# Patient Record
Sex: MALE | Race: WHITE
[De-identification: names, ages, dates, MRNs, and addresses within clinical notes are randomized per-mention and may not be internally consistent; named-entity substitution may affect disease eponyms.]

---

## 2019-10-29 ENCOUNTER — HOSPITAL ENCOUNTER (INPATIENT)
Dept: HOSPITAL 35 - ER | Age: 69
LOS: 1 days | Discharge: HOME | DRG: 377 | End: 2019-10-30
Attending: INTERNAL MEDICINE | Admitting: INTERNAL MEDICINE
Payer: COMMERCIAL

## 2019-10-29 VITALS — SYSTOLIC BLOOD PRESSURE: 147 MMHG | DIASTOLIC BLOOD PRESSURE: 106 MMHG

## 2019-10-29 VITALS — WEIGHT: 195 LBS | BODY MASS INDEX: 27.92 KG/M2 | HEIGHT: 70 IN

## 2019-10-29 VITALS — SYSTOLIC BLOOD PRESSURE: 150 MMHG | DIASTOLIC BLOOD PRESSURE: 62 MMHG

## 2019-10-29 VITALS — SYSTOLIC BLOOD PRESSURE: 160 MMHG | DIASTOLIC BLOOD PRESSURE: 110 MMHG

## 2019-10-29 VITALS — SYSTOLIC BLOOD PRESSURE: 76 MMHG | DIASTOLIC BLOOD PRESSURE: 36 MMHG

## 2019-10-29 VITALS — SYSTOLIC BLOOD PRESSURE: 142 MMHG | DIASTOLIC BLOOD PRESSURE: 81 MMHG

## 2019-10-29 VITALS — SYSTOLIC BLOOD PRESSURE: 158 MMHG | DIASTOLIC BLOOD PRESSURE: 116 MMHG

## 2019-10-29 VITALS — DIASTOLIC BLOOD PRESSURE: 109 MMHG | SYSTOLIC BLOOD PRESSURE: 165 MMHG

## 2019-10-29 VITALS — DIASTOLIC BLOOD PRESSURE: 109 MMHG | SYSTOLIC BLOOD PRESSURE: 152 MMHG

## 2019-10-29 DIAGNOSIS — D12.5: ICD-10-CM

## 2019-10-29 DIAGNOSIS — Z90.49: ICD-10-CM

## 2019-10-29 DIAGNOSIS — Z71.6: ICD-10-CM

## 2019-10-29 DIAGNOSIS — Z87.11: ICD-10-CM

## 2019-10-29 DIAGNOSIS — K29.01: Primary | ICD-10-CM

## 2019-10-29 DIAGNOSIS — Z79.899: ICD-10-CM

## 2019-10-29 DIAGNOSIS — F17.210: ICD-10-CM

## 2019-10-29 DIAGNOSIS — F90.9: ICD-10-CM

## 2019-10-29 DIAGNOSIS — N17.0: ICD-10-CM

## 2019-10-29 DIAGNOSIS — K57.30: ICD-10-CM

## 2019-10-29 DIAGNOSIS — K21.9: ICD-10-CM

## 2019-10-29 DIAGNOSIS — Z71.51: ICD-10-CM

## 2019-10-29 DIAGNOSIS — F12.10: ICD-10-CM

## 2019-10-29 DIAGNOSIS — K59.00: ICD-10-CM

## 2019-10-29 DIAGNOSIS — K58.9: ICD-10-CM

## 2019-10-29 LAB
ALBUMIN SERPL-MCNC: 4.3 G/DL (ref 3.4–5)
ALT SERPL-CCNC: 28 U/L (ref 30–65)
ANION GAP SERPL CALC-SCNC: 12 MMOL/L (ref 7–16)
AST SERPL-CCNC: 33 U/L (ref 15–37)
BASOPHILS NFR BLD AUTO: 0.8 % (ref 0–2)
BILIRUB DIRECT SERPL-MCNC: < 0.1 MG/DL
BILIRUB SERPL-MCNC: 0.4 MG/DL
BILIRUB UR-MCNC: NEGATIVE MG/DL
BUN SERPL-MCNC: 20 MG/DL (ref 7–18)
CALCIUM SERPL-MCNC: 10 MG/DL (ref 8.5–10.1)
CHLORIDE SERPL-SCNC: 101 MMOL/L (ref 98–107)
CO2 SERPL-SCNC: 25 MMOL/L (ref 21–32)
COLOR UR: YELLOW
CREAT SERPL-MCNC: 1.5 MG/DL (ref 0.7–1.3)
EOSINOPHIL NFR BLD: 1 % (ref 0–3)
ERYTHROCYTE [DISTWIDTH] IN BLOOD BY AUTOMATED COUNT: 13 % (ref 10.5–14.5)
GLUCOSE SERPL-MCNC: 98 MG/DL (ref 74–106)
GRANULOCYTES NFR BLD MANUAL: 71.1 % (ref 36–66)
HCT VFR BLD CALC: 47.2 % (ref 42–52)
HGB BLD-MCNC: 16.2 GM/DL (ref 14–18)
KETONES UR STRIP-MCNC: NEGATIVE MG/DL
LG PLATELETS BLD QL SMEAR: (no result)
LIPASE: 179 U/L (ref 73–393)
LYMPHOCYTES NFR BLD AUTO: 18.6 % (ref 24–44)
MCH RBC QN AUTO: 30.7 PG (ref 26–34)
MCHC RBC AUTO-ENTMCNC: 34.4 G/DL (ref 28–37)
MCV RBC: 89.2 FL (ref 80–100)
MONOCYTES NFR BLD: 8.5 % (ref 1–8)
NEUTROPHILS # BLD: 8 THOU/UL (ref 1.4–8.2)
PLATELET # BLD: 258 THOU/UL (ref 150–400)
POTASSIUM SERPL-SCNC: 4 MMOL/L (ref 3.5–5.1)
PROT SERPL-MCNC: 8.3 G/DL (ref 6.4–8.2)
RBC # BLD AUTO: 5.29 MIL/UL (ref 4.5–6)
RBC # UR STRIP: NEGATIVE /UL
SODIUM SERPL-SCNC: 138 MMOL/L (ref 136–145)
SP GR UR STRIP: 1.01 (ref 1–1.03)
URINE CLARITY: CLEAR
URINE GLUCOSE-RANDOM*: NEGATIVE
URINE LEUKOCYTES-REFLEX: NEGATIVE
URINE NITRITE-REFLEX: NEGATIVE
URINE PROTEIN (DIPSTICK): NEGATIVE
UROBILINOGEN UR STRIP-ACNC: 0.2 E.U./DL (ref 0.2–1)
WBC # BLD AUTO: 11.3 THOU/UL (ref 4–11)

## 2019-10-29 PROCEDURE — 0DBH8ZZ EXCISION OF CECUM, VIA NATURAL OR ARTIFICIAL OPENING ENDOSCOPIC: ICD-10-PCS | Performed by: INTERNAL MEDICINE

## 2019-10-29 PROCEDURE — 0DB68ZX EXCISION OF STOMACH, VIA NATURAL OR ARTIFICIAL OPENING ENDOSCOPIC, DIAGNOSTIC: ICD-10-PCS | Performed by: INTERNAL MEDICINE

## 2019-10-29 PROCEDURE — 10195: CPT

## 2019-10-29 PROCEDURE — 62900: CPT

## 2019-10-29 PROCEDURE — 0DBN8ZZ EXCISION OF SIGMOID COLON, VIA NATURAL OR ARTIFICIAL OPENING ENDOSCOPIC: ICD-10-PCS | Performed by: INTERNAL MEDICINE

## 2019-10-29 PROCEDURE — 62110: CPT

## 2019-10-29 PROCEDURE — 70005: CPT

## 2019-10-29 PROCEDURE — 0DBL8ZZ EXCISION OF TRANSVERSE COLON, VIA NATURAL OR ARTIFICIAL OPENING ENDOSCOPIC: ICD-10-PCS | Performed by: INTERNAL MEDICINE

## 2019-10-29 NOTE — NUR
CONSULT [phone] COMPLETED.  THIS  MET THE PATIENT.
WE DID LIFE REVIEW ABOUT HIS LIFE AND FAMILY.
WE DISCUSSED SOME DIFFERENT ISSUES.  WE CONCLUDED N PAULINA.

## 2019-10-29 NOTE — NUR
PT ADMITTED RELATED TO ABD PAIN, N/V. CM REVIEWED CHART AND SPOKE WITH CARE
TEAM. CM MET WITH PT AT BEDSIDE THIS DAY. PT IS A&O X4. CM ROLE INTRODUCED. PT
INDICATED HE LIVES IN A CONDO WITH HIS SIG OTHER WITH ELEVATOR ACCESS. PT
INDICATED HE HAD BEEN INDEPDENENT WITH GAIT AND ADLS PTA. PT INDICATED HE
PLANS TO RETURN HOME ONCE MEDICALLY STABLE. CM TO FOLLOW AS INDICATED WITH DC
PLANNING.

## 2019-10-29 NOTE — NUR
TOOK OVER CARE APPROX. 0700. A&OX4. PATIENT ON CLEAR LIQUIDS AND CAN ADVANCE
AS TOLERATED. WILL HAVE COLONOSCOPY TOMORROW AND WILL BE NPO AS OF MIDNIGHT.
PATIENT IS ANXIOUS ABOUT HIS CURRENT ILLNESS AND IS ASKING MANY QUESTIONS
ABOUT PAIN MANGMENT AND HOW CARE WILL PROCEED. REFUSED FLU VACCINE.

## 2019-10-29 NOTE — NUR
Pt arrived on unit from ED @0610 via WC accompanied by staff. A/OX4, oriented
to the room and made comfortable. C/o abd pain, orders obtained for pain meds
awaiting pharmacy verification. Pt is up ad nico encouraged to call for
assistance as needed and agrees to do so. Call light/personal items placed
within reach.

## 2019-10-30 VITALS — SYSTOLIC BLOOD PRESSURE: 135 MMHG | DIASTOLIC BLOOD PRESSURE: 77 MMHG

## 2019-10-30 LAB
ANION GAP SERPL CALC-SCNC: 7 MMOL/L (ref 7–16)
BUN SERPL-MCNC: 11 MG/DL (ref 7–18)
CALCIUM SERPL-MCNC: 9.1 MG/DL (ref 8.5–10.1)
CHLORIDE SERPL-SCNC: 104 MMOL/L (ref 98–107)
CO2 SERPL-SCNC: 29 MMOL/L (ref 21–32)
CREAT SERPL-MCNC: 1.2 MG/DL (ref 0.7–1.3)
GLUCOSE SERPL-MCNC: 74 MG/DL (ref 74–106)
POTASSIUM SERPL-SCNC: 3.9 MMOL/L (ref 3.5–5.1)
SODIUM SERPL-SCNC: 140 MMOL/L (ref 136–145)

## 2019-10-30 NOTE — NUR
ASSUMED CARE OF PT AT 0700. PT PULLED OUT L WRIST IV AND LEFT AMA FOR AN
HOUR, WAS ESCORTED BACK TO THE FLOOR BY SECURITY AND TOLD THAT IF IN ORDER TO
LEAVE AN AMA FORM NEEDS TO BE FILLED OUT PER DOCTOR. PT WENT TO HAVE A
COLONOSCOPY DONE AND WAS GIVEN PAIN MEDICATION BY IM. PT IS A&O X 4. BED IS IN
LOWEST POSITION. CALL LIGHT WITHIN REACH. WILL CONTNIUE TO MONITOR THE PT.

## 2019-10-30 NOTE — NUR
Assess due to RD consult received.  Admit with n/v and abdominal pain.
Diverticulosis without diverticulitis noted.  Further workup continues and pt
npo for colonoscopy.  Has had about 8-10 lb wt loss past month and decreased
intake.  Will follow up again 10/31 for more complete nutrition evaluation
once GI procedure completed.

## 2019-10-30 NOTE — NUR
ASSUMED CARE OF PT @1900 PT ASSESSED AT START OF SHIFT. ON CLEAR LIQUIDS AND
NPO AT MIDNIGHT FOR A COLONOSCOPY IN THE AM. PT AMBULATING IN THE HALLWAY.
NICOTINE PATCH INTACT IN RT ARM. NEW IV INSERTED 20 GUAGE IN RT FOREARM. PAIN
MED GIVEN FOR MANAGEMENT SEE EMARCarolina AGUILERAIEN ALSO GIVEN FOR SLEEP. HOURLY
ROUNDING DONE AND WILL CONT WITH POC TILL EOS.

## 2019-10-31 NOTE — P
[facility]
[address]
[place]   [address]                     PROCEDURE REPORT              
_______________________________________________________________________________
 
Name:       FRANKIE JURADO              Room #:         434-P       [facility] [place]  
M.R.#:      [id number]                       Account #:      [id number]  
Admission:  10/29/19    Attend Phys:    Tez Lainez MD   
Discharge:  10/30/19    Date of Birth:  [birth year]  
                                                          Report #: [id number]
                                                                    [id number]   
_______________________________________________________________________________
THIS REPORT FOR:   //name//                          
 
CC: Tez Blackwell
 
PATIENT OF DOCTORS:  Dr. Tez Lainez and Dr. Cosmo Blackwell.
 
PROCEDURE:  Colonoscopy with snare polypectomy and biopsies.
 
INDICATION FOR PROCEDURE:  Evaluate right lower quadrant abdominal pain.  The
patient is status post appendectomy.  He has a history of colonic
diverticulosis.
 
Informed consent for this procedure was obtained prior to the administration any
medication.  The risks of the procedure include but are not limited to the
following:  Bleeding, perforation, infection, complications of sedation and the
possibility I could miss something.  These possible risks were explained to the
patient and he has indicated his consent to proceed with colonoscopy.
 
Anesthesia kindly provided deep sedation for this procedure.
 
PROCEDURE IN DETAIL:  With the patient in the left lateral decubitus position,
digital rectal exam was performed and no abnormalities were palpated.  Then, the
Olympus colonoscope was introduced through the anal sphincter and advanced under
direct visualization to the terminal ileum.  Findings were noted on withdrawal
of the scope.  The terminal ileal mucosa appears normal.  In the cecum, there
are multiple uncomplicated diverticula noted.  There was one 3 mm cecal polyp
that was removed with a hot snare and sent to pathology lab.  Good hemostasis
was noted after that polypectomy.  The remaining cecal mucosa appeared normal. 
Ascending colon, normal mucosa.  Hepatic flexure, normal mucosa.  Transverse
colon, normal mucosa.  Splenic flexure, there was a 3 mm polyp in the splenic
flexure that was removed in toto with a hot snare and sent to pathology lab. 
Good hemostasis was noted after that polypectomy.  The remaining splenic flexure
appeared normal.  Descending colon, normal mucosa.  Sigmoid colon, at 25 cm in
the sigmoid colon, there was a 5 mm sessile polyp that was removed in toto with
a hot snare and sent to pathology lab.  Good hemostasis was noted after that
polypectomy.  The remaining sigmoid colon contained multiple uncomplicated
diverticula scattered intermittently throughout the entire colon.  At 15 cm in
the distal sigmoid, there is an irregular appearing 12 mm mass that I removed in
2-3 pieces with a hot snare.  These pieces were recovered and sent to pathology
lab for analysis.  Grossly, it appeared that all the mass was removed, but
microscopically I cannot guarantee complete removal.  We will have to wait for
the path report and the margin assessment.
 
 
 
 
[address]   [address]                     PROCEDURE REPORT              
_______________________________________________________________________________
 
Name:       FRANKIE JURADO              Room #:         434-P       [facility] [place]  
M.R.#:      [id number]                       Account #:      [id number]  
Admission:  10/29/19    Attend Phys:    Tez Lainez MD   
Discharge:  10/30/19    Date of Birth:  [birth year]  
                                                          Report #: [id number]
                                                                    [id number]   
_______________________________________________________________________________
Also, at 15 cm, there were 3 small polyps, all less than or equal to 5 mm and
were removed in toto with a hot snare and sent to pathology lab as well.  Good
hemostasis was noted after all polypectomies and biopsies.  Rectum, normal
mucosa.  Retroflexed view did not reveal any further abnormalities.  The scope
was withdrawn.  The patient went to the recovery area in stable condition.  He
tolerated the procedure well.
 
IMPRESSION:
1.  Uncomplicated pancolonic diverticulosis.
2.  A 3 mm cecal polyp removed from the cecum as above.
3.  A 3 mm splenic flexure polyp removed with a hot snare and sent to pathology.
4.  A 5 mm sessile polyp at 25 cm, removed in toto with a hot snare and sent to
pathology lab.
5.  Three small polyps at 15 cm, all less than or equal to 5 mm were removed in
too with hot snare and sent to pathology lab.
6.  Irregular appearing 12 mm sessile mass at 15 cm was removed in 2 to 3 pieces
with hot snare and sent to pathology lab.  Grossly, it appears that all this
mass was removed, but microscopically I cannot guarantee complete removal.  We
will have to wait for the path report and the margin assessment.  The rectum
appeared normal.
 
RECOMMENDATIONS:  My recommendations were to await the path report and make
further determinations on any future colonoscopies and further treatment for
these possibly or potentially premalignant or malignant lesions.
 
Thank you very much once again for allowing me to participate in his care.
 
 
 
 
 
 
 
 
 
 
 
 
 
 
 
 
 
 
  <ELECTRONICALLY SIGNED>
   By: Angela Holley DO         
  10/31/19     2124
D: 10/30/19 2143                           _____________________________________
T: 10/31/19 1427                           Angela Holley DO           /nt

## 2019-10-31 NOTE — P
[facility]
[address]
[place]   [address]                     PROCEDURE REPORT              
_______________________________________________________________________________
 
Name:       FRANKIE JURADO              Room #:         434-P       [facility] [place]  
M.R.#:      [id number]                       Account #:      [id number]  
Admission:  10/29/19    Attend Phys:    Tez Lainez MD   
Discharge:  10/30/19    Date of Birth:  [birth year]  
                                                          Report #: [id number]
                                                                    [id number]   
_______________________________________________________________________________
THIS REPORT FOR:   //name//                          
 
CC: Tez Blackwell MD
 
ESOPHAGOGASTRODUODENOSCOPY WITH BIOPSIES
 
PATIENT OF:  Dr. Andrew Blackwell and Dr. Tez Lainez.
 
INDICATION FOR PROCEDURE:  The patient has right lower quadrant abdominal pain. 
He is status post appendectomy.  He has a history of colonic diverticulosis. 
Colonoscopy is being performed to evaluate for his moderately severe right lower
quadrant pain with rebound.  CT scan is unrevealing as to the etiology of these
findings.
 
Informed consent for this procedure was obtained prior to the administration of
any medication.  The risks of the procedure, which include bleeding,
perforation, infection, complications of sedation and the possibility I could
miss something were explained to the patient and he has indicated his consent by
signing.  The patient also has epigastric pain.
 
DESCRIPTION OF PROCEDURE:  With the patient in the left lateral decubitus
position and anesthesia providing adequate deep sedation, the Olympus upper
videoscope was introduced through the upper esophageal sphincter and advanced
under direct visualization to the third portion of the duodenum.  Findings are
noted on withdrawal of the scope.  The visualized portions of the duodenum
appeared normal.  Pylorus, normal mucosa.  In the antrum, there was a moderately
severe erosive hemorrhagic gastritis.  Biopsies were obtained x 2 from this area
for histopathology.  Good hemostasis was noted after the biopsies.  Body, normal
mucosa.  Cardia and fundus, normal mucosa.  Retroflex view did not reveal any
further abnormalities.  The scope was withdrawn to the esophagus.  The Z-line
was appropriately located at the top of the gastric folds and it was normal. 
The esophageal mucosa appears normal throughout its entirety.  The scope was
withdrawn.  The patient was turned for colonoscopy.
 
IMPRESSION:  Moderately severe erosive hemorrhagic gastritis, biopsies pending. 
Other than that, normal esophagogastroduodenoscopy to descending duodenum.
 
RECOMMENDATIONS:  My recommendations were for him to increase his proton pump
inhibitors, Protonix 40 mg p.o. b.i.d.  We will await the biopsy results.  He
may benefit from avoidance of nonsteroidal anti-inflammatory medications and
aspirin if possible.
 
 
 
[address]   [address]                     PROCEDURE REPORT              
_______________________________________________________________________________
 
Name:       FRANKIE JURADO MIKE              Room #:         434-P       [facility] [place]  
.R.#:      [id number]                       Account #:      [id number]  
Admission:  10/29/19    Attend Phys:    Tez Lainez MD   
Discharge:  10/30/19    Date of Birth:  [birth year]  
                                                          Report #: [id number]
                                                                    [id number]   
_______________________________________________________________________________
 
Thank you very much once again for allowing me to participate in his care.
 
 
 
 
 
 
 
 
 
 
 
 
 
 
 
 
 
 
 
 
 
 
 
 
 
 
 
 
 
 
 
 
 
 
 
 
 
 
 
 
 
 
  <ELECTRONICALLY SIGNED>
   By: Angela Holley DO         
  10/31/19     2124
D: 10/30/19 2137                           _____________________________________
T: 10/31/19 1412                           Angela Holley DO           /nt

## 2019-11-01 NOTE — PATH
[facility]
[address]
[place]   [address]                     PATHOLOGY RPT PROCEDURE       
_______________________________________________________________________________
 
Name:       FRANKIE MAR              Room #:         434-P       [facility] [place]  
M.R.#:      [id number]     Account #:      [id number]  
Admission:  10/29/19    Date of Birth:  [birth year]  
Discharge:  10/30/19                                    Report #:    [id number]
                                                        Path Case #: [id number]
_______________________________________________________________________________
 
[facility] Accession Number: [id number]
.                                                                01
Material submitted:                                        .
PART A: stomach - GASTRIC BIOPSY TO R/O H. PYLORI
PART B: cecum - POLYP AT CECUM
PART C: splenic flexure - POLYP AT SPLENIC FLEXURE
PART D: colon - POLYP AT 25CM
PART E: colon - POLYP AT 15CM X3 AND SESSILE MASS X1
.                                                                01
Clinical history:                                          .
Preop DX: Abdominal pain
Postop DX: Duodenitis, moderate erosive gastritis
A. r/o h. Pylori
.                                                                02
**********************************************************************
Diagnosis:
A. Gastric mucosa, gastric rule out H. pylori, endoscopic biopsy:
- Helicobacter pylori induced moderate active gastritis.
- Negative for intestinal metaplasia, atrophy or dysplasia.
- Mild to moderate number of organisms present on properly controlled
immunohistochemical stain.
.
B. Polyp, at cecum, endoscopic biopsy:
- Tubular adenoma.
- Negative for high grade dysplasia.
.
C. Polyp, at splenic flexure, endoscopic biopsy:
- Hyperplastic polyp.
- Negative for dysplasia.
.
D. Polyp, at 25 cm, endoscopic biopsy:
- Tubular adenoma.
- Negative for high grade dysplasia.
.
E. Polyp x 3 at 15 cm and sessile mass x 1, endoscopic biopsy:
- One fragment showing tubular adenoma without high grade dysplasia.
- Remainder of fragments showing inflamed hyperplastic polyps without
dysplasia.  (See comment)
LBQ  11/01/2019  1425 Local
**********************************************************************
.                                                                02
Comment:
Part E: Tubular adenoma is identified in a 2 mm fragment.  Remainder of
fragments sampled range from 2 mm to approximately 1.0 cm in greatest
dimension.  All of the others show hyperplastic polyps with reactive
changes.  There is no dysplasia within the remainder of the fragments.
(IUV/db; 11/01/2019)
 
 
[address]   [address]                     PATHOLOGY RPT PROCEDURE       
_______________________________________________________________________________
 
Name:       FRANKIE MAR              Room #:         434-P       [facility] [place]  
M.R.#:      [id number]     Account #:      [id number]  
Admission:  10/29/19    Date of Birth:  [birth year]  
Discharge:  10/30/19                                    Report #:    [id number]
                                                        Path Case #: [id number]
_______________________________________________________________________________
.                                                                02
Electronically signed:                                     .
Priscilla Scott MD, Pathologist
NPI- [id number]
.                                                                01
Gross description:                                         .
A.  Received in formalin labeled "Frankie Mar, gastric biopsy to r/o
h. pylori," are two segments of tan-brown soft tissue measuring 0.2 x 0.2
x 0.1 cm and 0.4 x 0.3 x 0.2 cm in greatest dimensions.  The specimen is
submitted entirely in cassette A1.
.
B.  Received in formalin labeled "Frankie Mar, polyp at cecum," is a
segment of tan soft tissue measuring 0.5 x 0.2 x 0.2 cm in greatest
dimensions admixed with dark brown vegetative material.  The specimen is
submitted entirely in cassette B1.
.
C.  Received in formalin labeled "Frankie Mar, polyp at splenic
flexure," is a segment of tan soft tissue measuring 0.4 x 0.3 x 0.3 cm in
greatest dimensions admixed with scant dark brown vegetative material.
The specimen is submitted entirely in cassette C1.
.
D.  Received in formalin labeled "Frankie Mar, polyp at 25 cm," are
two segments of tan soft tissue measuring 0.2 x 0.2 x 0.1 cm and 0.3 x 0.3
x 0.2 cm in greatest dimensions admixed with scant dark brown vegetative
material.  The specimen is submitted entirely in cassette D1.
.
E.  The specimen is received in formalin labeled "Frnakie Mar, polyp
at 15 cm".  The specimen source is listed on the requisition as "polyp at
15 cm x3 and sessile mass x1".  Received are two polypoid segments of
tan-brown soft tissue measuring 1.0 x 0.4 x 0.4 cm (inked yellow) and 1.0
x 0.7 x 0.5 cm (inked black) admixed with a 2.6 x 1.0 x 0.2 cm aggregate
of smaller tan soft tissue fragments and green-brown vegetative material.
The inked tissue segments are sectioned and submitted entirely in cassette
E1.  The remaining material is submitted entirely in cassette E2.
(DAC; 10/31/2019)
XDC/XDC  10/31/2019  1353 Local
.                                                                02
Pathologist provided ICD-10:
K29.50, D12.0, K63.5, D12.6
.                                                                02
CPT                                                        .
058385, 255249, 311102, 367274, 216995, [id number]
Specimen Comment: A courtesy copy of this report has been sent to [phone],
[phone]
Specimen Comment: [id number], [phone]
Specimen Comment: Report sent to ,DR CATALAN / DR MARION
***Performed at:  01
   [facility]
[address] [place]   [address]                     PATHOLOGY RPT PROCEDURE       
_______________________________________________________________________________
 
Name:       FRANKIE MAR              Room #:         434-P       [facility] [place]  
M.R.#:      [id number]     Account #:      [id number]  
Admission:  10/29/19    Date of Birth:  [birth year]  
Discharge:  10/30/19                                    Report #:    [id number]
                                                        Path Case #: [id number]
_______________________________________________________________________________
   [address] [facility] [address], [place], [place]  [id number]
   MD Ramos Barros MD Phone:  [phone]
***Performed at:  02
   [address]  [id number]
   MD Priscilla Scott MD Phone:  [phone]